# Patient Record
Sex: FEMALE | Race: WHITE | NOT HISPANIC OR LATINO | Employment: UNEMPLOYED | URBAN - METROPOLITAN AREA
[De-identification: names, ages, dates, MRNs, and addresses within clinical notes are randomized per-mention and may not be internally consistent; named-entity substitution may affect disease eponyms.]

---

## 2017-12-27 ENCOUNTER — PRE VISIT (OUTPATIENT)
Dept: MATERNAL FETAL MEDICINE | Facility: CLINIC | Age: 27
End: 2017-12-27

## 2018-01-02 ENCOUNTER — OFFICE VISIT (OUTPATIENT)
Dept: MATERNAL FETAL MEDICINE | Facility: CLINIC | Age: 28
End: 2018-01-02
Attending: FAMILY MEDICINE
Payer: COMMERCIAL

## 2018-01-02 ENCOUNTER — HOSPITAL ENCOUNTER (OUTPATIENT)
Dept: ULTRASOUND IMAGING | Facility: CLINIC | Age: 28
Discharge: HOME | End: 2018-01-02
Attending: FAMILY MEDICINE | Admitting: FAMILY MEDICINE
Payer: COMMERCIAL

## 2018-01-02 DIAGNOSIS — O26.90 PREGNANCY RELATED CONDITION, UNSPECIFIED TRIMESTER: ICD-10-CM

## 2018-01-02 DIAGNOSIS — O30.031 MONOCHORIONIC DIAMNIOTIC TWIN GESTATION IN FIRST TRIMESTER: Primary | ICD-10-CM

## 2018-01-02 PROCEDURE — 76801 OB US < 14 WKS SINGLE FETUS: CPT

## 2018-01-02 NOTE — MR AVS SNAPSHOT
After Visit Summary   1/2/2018    Jennifer Art    MRN: [id number]           Patient Information     Date Of Birth          1990        Visit Information        Provider Department      1/2/2018 11:30 AM Ayanna Varma MD [facility] Maternal Fetal Medicine [facility]        Today's Diagnoses     Monochorionic diamniotic twin gestation in first trimester    -  1       Follow-ups after your visit        Your next 10 appointments already scheduled     Feb 06, 2018  1:30 PM CST   (Arrive by 1:15 PM)   MFM US OB COMPL 2/3 TRI TWINS with URMFMUSR1   [facility] Maternal Fetal Medicine Ultrasound - [facility])    [address]  [facility] [address] [phone]           Wear comfortable clothes and leave your valuables at home.            Feb 06, 2018  2:00 PM CST   Radiology MD with UR CLOVIS GIRON   [facility] Maternal Fetal Medicine - [facility])    [address]  [facility] [address]   [phone]           Please arrive at the time given for your first appointment. This visit is used internally to schedule the physician's time during your ultrasound.              Future tests that were ordered for you today     Open Future Orders        Priority Expected Expires Ordered    MFM US OB Complete 2/3 Tri Twins Routine 2/6/2018 11/2/2018 1/2/2018            Who to contact     If you have questions or need follow up information about today's clinic visit or your schedule please contact [facility] MATERNAL FETAL MEDICINE [facility] directly at [phone].  Normal or non-critical lab and imaging results will be communicated to you by MyChart, letter or phone within 4 business days after the clinic has received the results. If you do not hear from us within 7 days, please contact the clinic through MyChart or phone. If you have a critical or abnormal lab result, we will notify you by phone as soon as  "possible.  Submit refill requests through [employer] or call your pharmacy and they will forward the refill request to us. Please allow 3 business days for your refill to be completed.          Additional Information About Your Visit        imgfaveharWisecam Information     [employer] lets you send messages to your doctor, view your test results, renew your prescriptions, schedule appointments and more. To sign up, go to [email or web address] [email or web address] [email or web address] . Click on \"Log in\" on the left side of the screen, which will take you to the Welcome page. Then click on \"Sign up Now\" on the right side of the page.     You will be asked to enter the access code listed below, as well as some personal information. Please follow the directions to create your username and password.     Your access code is: [email or web address]  Expires: 2018  5:36 PM     Your access code will  in 90 days. If you need help or a new code, please call your [place] clinic or [phone].        Care EveryWhere ID     This is your Care EveryWhere ID. This could be used by other organizations to access your [place] medical records  [id number]         Blood Pressure from Last 3 Encounters:   No data found for BP    Weight from Last 3 Encounters:   No data found for Wt               Primary Care Provider Office Phone # Fax #    Talisha Austin [phone]       [facility] [address]  [facility] [address]        Equal Access to Services     [facility] Hospital[facility]EN : Hadii aad ku hadasho Soomaali, waaxda luqadaha, qaybta kaalmada adeegyada, waxay nighat reyes . So [facility] [phone].    ATENCIÓN: Si habla español, tiene a campos disposición servicios gratuitos de asistencia lingüística. Llame al [phone].    We comply with applicable federal civil rights laws and [place] laws. We do not discriminate on the basis of race, color, national origin, age, disability, sex, sexual orientation, or gender identity.            Thank you!     " Thank you for choosing [facility]  for your care. Our goal is always to provide you with excellent care. Hearing back from our patients is one way we can continue to improve our services. Please take a few minutes to complete the written survey that you may receive in the mail after your visit with us. Thank you!             Your Updated Medication List - Protect others around you: Learn how to safely use, store and throw away your medicines at www.disposemymeds.org.      Notice  As of 1/2/2018  5:36 PM    You have not been prescribed any medications.

## 2018-01-02 NOTE — PROGRESS NOTES
"Please see \"Imaging\" tab under \"Chart Review\" for details of today's visit.    Ayanna Varma    "

## 2018-02-06 ENCOUNTER — OFFICE VISIT (OUTPATIENT)
Dept: MATERNAL FETAL MEDICINE | Facility: CLINIC | Age: 28
End: 2018-02-06
Attending: OBSTETRICS & GYNECOLOGY
Payer: COMMERCIAL

## 2018-02-06 ENCOUNTER — HOSPITAL ENCOUNTER (OUTPATIENT)
Dept: ULTRASOUND IMAGING | Facility: CLINIC | Age: 28
Discharge: HOME | End: 2018-02-06
Attending: OBSTETRICS & GYNECOLOGY | Admitting: OBSTETRICS & GYNECOLOGY
Payer: COMMERCIAL

## 2018-02-06 DIAGNOSIS — O30.031 MONOCHORIONIC DIAMNIOTIC TWIN GESTATION IN FIRST TRIMESTER: ICD-10-CM

## 2018-02-06 DIAGNOSIS — O30.039 MONOCHORIONIC DIAMNIOTIC TWIN PREGNANCY, ANTEPARTUM: Primary | ICD-10-CM

## 2018-02-06 PROCEDURE — 76820 UMBILICAL ARTERY ECHO: CPT | Mod: 59 | Performed by: OBSTETRICS & GYNECOLOGY

## 2018-02-06 PROCEDURE — 76810 OB US >/= 14 WKS ADDL FETUS: CPT

## 2018-02-06 NOTE — MR AVS SNAPSHOT
After Visit Summary   2/6/2018    Jennifer Art    MRN: [id number]           Patient Information     Date Of Birth          1990        Visit Information        Provider Department      2/6/2018 2:00 PM Alvarez Sandoval MD [facility] Maternal Fetal Medicine - [place]        Today's Diagnoses     Monochorionic diamniotic twin pregnancy, antepartum    -  1       Follow-ups after your visit        Your next 10 appointments already scheduled     Feb 27, 2018  1:30 PM CST   (Arrive by 1:15 PM)   MFM US COMPRE TWINS with RHMFMUSR3   [facility] Maternal Fetal Medicine Ultrasound - [facility])    [address]  [facility] [address] [phone]           Wear comfortable clothes and leave your valuables at home.            Feb 27, 2018  2:45 PM CST   Radiology MD with  CLOVIS GIRON   [facility] Maternal Fetal Medicine - [facility])    [address]  [facility] [address] [phone]           Please arrive at the time given for your first appointment. This visit is used internally to schedule the physician's time during your ultrasound.            Mar 13, 2018 11:00 AM CDT   Ech Fetal Complete* with URFETR1   [facility] Echo[facility]EKG ([facility])    [address] [facility] [address]               Mar 13, 2018 12:00 PM CDT   Ech Fetal -Twin B Complete* with URFETR1   [facility] Echo[facility] ([facility])    [address] [facility] [address]               Mar 13, 2018  1:30 PM CDT   (Arrive by 1:15 PM)   MFM US COMPRE TWINS F/U with URMFMUSR1   [facility] Maternal Fetal Medicine Ultrasound - [place] ([facility])    [address]  [facility] [address] [phone]           Wear comfortable clothes and leave your valuables at home.            Mar 13, 2018  2:00 PM CDT   Radiology MD with UR CLOVIS GIRON   [facility] Maternal  "Fetal Medicine [facility] ([facility])    [address]  [facility] [place] [address]   [phone]           Please arrive at the time given for your first appointment. This visit is used internally to schedule the physician's time during your ultrasound.              Future tests that were ordered for you today     Open Future Orders        Priority Expected Expires Ordered    Echo Fetal Complete-Peds Cardiology Routine  2/6/2019 2/6/2018    Echo Fetal - Twin B Complete - Peds Cardiology Routine  2/6/2019 2/6/2018    [facility] US Comprehensive Twins F/U Routine 2/20/2018 12/6/2018 2/6/2018    MF US Comprehensive Twins Routine 3/6/2018 12/6/2018 2/6/2018            Who to contact     If you have questions or need follow up information about today's clinic visit or your schedule please contact [facility] [facility] MATERNAL FETAL MEDICINE [facility] directly at [phone].  Normal or non-critical lab and imaging results will be communicated to you by Entrechart, letter or phone within 4 business days after the clinic has received the results. If you do not hear from us within 7 days, please contact the clinic through Entrechart or phone. If you have a critical or abnormal lab result, we will notify you by phone as soon as possible.  Submit refill requests through [employer] or call your pharmacy and they will forward the refill request to us. Please allow 3 business days for your refill to be completed.          Additional Information About Your Visit        EntrecharAppSlingr Information     [employer] lets you send messages to your doctor, view your test results, renew your prescriptions, schedule appointments and more. To sign up, go to [email or web address] . Click on \"Log in\" on the left side of the screen, which will take you to the Welcome page. Then click on \"Sign up Now\" on the right side of the page.     You will be asked to enter the access code listed below, as well as some personal information. Please " follow the directions to create your username and password.     Your access code is: [email or web address]  Expires: 2018  5:36 PM     Your access code will  in 90 days. If you need help or a new code, please call your [place] clinic or [phone].        Care EveryWhere ID     This is your Care EveryWhere ID. This could be used by other organizations to access your [place] medical records  [id number]         Blood Pressure from Last 3 Encounters:   No data found for BP    Weight from Last 3 Encounters:   No data found for Wt               Primary Care Provider Office Phone # Fax #    Talisha Austin [phone]       [facility] [address]  [facility] [address]        Equal Access to Services     JAILYN SINGLETON : Hadii von bee hadasho Soomaali, waaxda luqadaha, qaybta kaalmada adeegyada, waxcristi disla haydiazn smith reyes . So [facility] [phone].    ATENCIÓN: Si habla español, tiene a campos disposición servicios gratuitos de asistencia lingüística. Llame al [phone].    We comply with applicable federal civil rights laws and [place] laws. We do not discriminate on the basis of race, color, national origin, age, disability, sex, sexual orientation, or gender identity.            Thank you!     Thank you for choosing [facility]  for your care. Our goal is always to provide you with excellent care. Hearing back from our patients is one way we can continue to improve our services. Please take a few minutes to complete the written survey that you may receive in the mail after your visit with us. Thank you!             Your Updated Medication List - Protect others around you: Learn how to safely use, store and throw away your medicines at www.disposemymeds.org.      Notice  As of 2018  3:22 PM    You have not been prescribed any medications.

## 2018-02-06 NOTE — PROGRESS NOTES
Please refer to ultrasound report under 'Imaging' Studies of 'Chart Review' tabs.    Alvarez Sandoval M.D.

## 2018-02-27 ENCOUNTER — HOSPITAL ENCOUNTER (OUTPATIENT)
Dept: ULTRASOUND IMAGING | Facility: CLINIC | Age: 28
Discharge: HOME | End: 2018-02-27
Attending: OBSTETRICS & GYNECOLOGY | Admitting: OBSTETRICS & GYNECOLOGY
Payer: COMMERCIAL

## 2018-02-27 ENCOUNTER — OFFICE VISIT (OUTPATIENT)
Dept: MATERNAL FETAL MEDICINE | Facility: CLINIC | Age: 28
End: 2018-02-27
Attending: OBSTETRICS & GYNECOLOGY
Payer: COMMERCIAL

## 2018-02-27 DIAGNOSIS — O30.039 MONOCHORIONIC DIAMNIOTIC TWIN PREGNANCY, ANTEPARTUM: ICD-10-CM

## 2018-02-27 DIAGNOSIS — O30.032 MONOCHORIONIC DIAMNIOTIC TWIN GESTATION IN SECOND TRIMESTER: Primary | ICD-10-CM

## 2018-02-27 PROCEDURE — 76820 UMBILICAL ARTERY ECHO: CPT

## 2018-02-27 PROCEDURE — 76811 OB US DETAILED SNGL FETUS: CPT

## 2018-02-27 PROCEDURE — 76820 UMBILICAL ARTERY ECHO: CPT | Mod: 59

## 2018-02-27 NOTE — MR AVS SNAPSHOT
After Visit Summary   2/27/2018    Jennifer Art    MRN: [id number]           Patient Information     Date Of Birth          1990        Visit Information        Provider Department      2/27/2018 2:45 PM Tiffanie Bartlett MD [facility] Maternal Fetal Medicine - [facility]        Today's Diagnoses     Monochorionic diamniotic twin gestation in second trimester    -  1       Follow-ups after your visit        Your next 10 appointments already scheduled     Mar 13, 2018 11:00 AM CDT   Ech Fetal Complete* with URFETR1   [facility] Echo[facility]EKG ([facility])    [address]  [facility] [address]               Mar 13, 2018 12:00 PM CDT   Ech Fetal -Twin B Complete* with URFETR1   [facility] Echo[facility]KG ([facility])    [address]  [facility] [address]               Mar 13, 2018  1:30 PM CDT   (Arrive by 1:15 PM)   MFM US COMPRE TWINS F/U with URMFMUSR1   [facility] Maternal Fetal Medicine Ultrasound - [facility])    [address]  [facility] [address] [phone]           Wear comfortable clothes and leave your valuables at home.            Mar 13, 2018  2:00 PM CDT   Radiology MD with UR CLOVIS GIRON   [facility] Maternal Fetal Medicine - [facility])    [address]  [facility] [address]   [phone]           Please arrive at the time given for your first appointment. This visit is used internally to schedule the physician's time during your ultrasound.            Mar 27, 2018  1:30 PM CDT   (Arrive by 1:15 PM)   MFM US COMPRE TWINS F/U with RHMFMUSR2   [facility] Maternal Fetal Medicine Ultrasound - [facility] ([facility])    [address] [facility] [address]  [facility] [address] [phone]           Wear comfortable clothes and leave your valuables at home.            Mar 27, 2018  2:00 PM CDT   Radiology MD with  CLOVIS GIRON    [facility] Maternal Fetal [facility] ([facility])    [address]  [facility] [address] [phone]           Please arrive at the time given for your first appointment. This visit is used internally to schedule the physician's time during your ultrasound.            Apr 10, 2018  1:30 PM CDT   (Arrive by 1:15 PM)   [facility] US COMPRE TWINS F/U with RHMFMUSR1   [facility] Fetal [facility] Ultrasound - [facility])    [address]  [facility] [address] [phone]           Wear comfortable clothes and leave your valuables at home.            Apr 10, 2018  2:00 PM CDT   Radiology MD with [facility] MD   [facility] Fetal [facility])    [address]  [facility] [address] [phone]           Please arrive at the time given for your first appointment. This visit is used internally to schedule the physician's time during your ultrasound.              Future tests that were ordered for you today     Open Future Orders        Priority Expected Expires Ordered    M US Comprehensive Twins F/U Routine 3/27/2018 12/27/2018 2/27/2018    [facility] US Comprehensive Twins F/U Routine 4/10/2018 12/27/2018 2/27/2018            Who to contact     If you have questions or need follow up information about today's clinic visit or your schedule please contact [facility] F[facility]AL [facility] directly at [phone].  Normal or non-critical lab and imaging results will be communicated to you by [employer], letter or phone within 4 business days after the clinic has received the results. If you do not hear from us within 7 days, please contact the clinic through [employer] Scholar[employer] or phone. If you have a critical or abnormal lab result, we will notify you by phone as soon as possible.  Submit refill requests through [employer] or call your pharmacy and they will forward the refill request to us. Please  "allow 3 business days for your refill to be completed.          Additional Information About Your Visit        MyChart Information     Ticket[employer] lets you send messages to your doctor, view your test results, renew your prescriptions, schedule appointments and more. To sign up, go to [email or web address] . Click on \"Log in\" on the left side of the screen, which will take you to the Welcome page. Then click on \"Sign up Now\" on the right side of the page.     You will be asked to enter the access code listed below, as well as some personal information. Please follow the directions to create your username and password.     Your access code is: [email or web address]  Expires: 2018  5:36 PM     Your access code will  in 90 days. If you need help or a new code, please call your [place] clinic or [phone].        Care EveryWhere ID     This is your Care EveryWhere ID. This could be used by other organizations to access your [place] medical records  [id number]         Blood Pressure from Last 3 Encounters:   No data found for BP    Weight from Last 3 Encounters:   No data found for Wt               Primary Care Provider Office Phone # Fax #    Talisha Austin [phone]       [facility] [address]  [facility] [address]        Equal Access to Services     [facility] CenterKIRSTEN : Hadii aad ku hadasho Soomaali, waaxda luqadaha, qaybta kaalmada adeegyada, randy reyes . So [facility] [place] [facility] [phone].    ATENCIÓN: Si habla español, tiene a campos disposición servicios gratuitos de asistencia lingüística. Llame al [phone].    We comply with applicable federal civil rights laws and [place] laws. We do not discriminate on the basis of race, color, national origin, age, disability, sex, sexual orientation, or gender identity.            Thank you!     Thank you for choosing [facility]  for your care. Our goal is always to provide you with excellent " care. Hearing back from our patients is one way we can continue to improve our services. Please take a few minutes to complete the written survey that you may receive in the mail after your visit with us. Thank you!             Your Updated Medication List - Protect others around you: Learn how to safely use, store and throw away your medicines at www.disposemymeds.org.      Notice  As of 2/27/2018  3:33 PM    You have not been prescribed any medications.

## 2018-02-27 NOTE — PROGRESS NOTES
Please see full imaging report from ViewPoint program under imaging tab.    Reassuring findings reviewed with Jennifer today. She will have fetal echos in two weeks (for monodi twins and 2VC twin A/1) and have TTTS surveillance with our practice every two weeks. She is being co-managed by a local doctor at [facility] (Dr. Talisha Austin) in [place] and also Dr. Salomon ([facility] [place]) for anticipated delivery at 34-37 weeks (depending on fetal status) at [facility].    Tiffanie Bartlett MD  Maternal Fetal Medicine

## 2018-03-13 ENCOUNTER — HOSPITAL ENCOUNTER (OUTPATIENT)
Dept: CARDIOLOGY | Facility: CLINIC | Age: 28
End: 2018-03-13
Attending: OBSTETRICS & GYNECOLOGY
Payer: COMMERCIAL

## 2018-03-13 ENCOUNTER — HOSPITAL ENCOUNTER (OUTPATIENT)
Dept: ULTRASOUND IMAGING | Facility: CLINIC | Age: 28
Discharge: HOME | End: 2018-03-13
Attending: OBSTETRICS & GYNECOLOGY | Admitting: OBSTETRICS & GYNECOLOGY
Payer: COMMERCIAL

## 2018-03-13 ENCOUNTER — OFFICE VISIT (OUTPATIENT)
Dept: MATERNAL FETAL MEDICINE | Facility: CLINIC | Age: 28
End: 2018-03-13
Attending: OBSTETRICS & GYNECOLOGY
Payer: COMMERCIAL

## 2018-03-13 DIAGNOSIS — O30.039 MONOCHORIONIC DIAMNIOTIC TWIN PREGNANCY, ANTEPARTUM: ICD-10-CM

## 2018-03-13 DIAGNOSIS — O30.032 MONOCHORIONIC DIAMNIOTIC TWIN GESTATION IN SECOND TRIMESTER: Primary | ICD-10-CM

## 2018-03-13 DIAGNOSIS — Q27.0 SINGLE UMBILICAL ARTERY: ICD-10-CM

## 2018-03-13 PROCEDURE — 76820 UMBILICAL ARTERY ECHO: CPT

## 2018-03-13 PROCEDURE — 76825 ECHO EXAM OF FETAL HEART: CPT | Mod: 59

## 2018-03-13 PROCEDURE — 76816 OB US FOLLOW-UP PER FETUS: CPT | Mod: 59

## 2018-03-13 PROCEDURE — 93325 DOPPLER ECHO COLOR FLOW MAPG: CPT

## 2018-03-27 ENCOUNTER — HOSPITAL ENCOUNTER (OUTPATIENT)
Dept: ULTRASOUND IMAGING | Facility: CLINIC | Age: 28
Discharge: HOME | End: 2018-03-27
Attending: OBSTETRICS & GYNECOLOGY | Admitting: OBSTETRICS & GYNECOLOGY
Payer: COMMERCIAL

## 2018-03-27 ENCOUNTER — OFFICE VISIT (OUTPATIENT)
Dept: MATERNAL FETAL MEDICINE | Facility: CLINIC | Age: 28
End: 2018-03-27
Attending: OBSTETRICS & GYNECOLOGY
Payer: COMMERCIAL

## 2018-03-27 DIAGNOSIS — O09.899 SINGLE UMBILICAL ARTERY AFFECTING MANAGEMENT OF MOTHER IN SINGLETON PREGNANCY, ANTEPARTUM: ICD-10-CM

## 2018-03-27 DIAGNOSIS — O30.032 MONOCHORIONIC DIAMNIOTIC TWIN GESTATION IN SECOND TRIMESTER: Primary | ICD-10-CM

## 2018-03-27 DIAGNOSIS — O30.032 MONOCHORIONIC DIAMNIOTIC TWIN GESTATION IN SECOND TRIMESTER: ICD-10-CM

## 2018-03-27 PROCEDURE — 76816 OB US FOLLOW-UP PER FETUS: CPT | Mod: 59

## 2018-03-27 PROCEDURE — 76820 UMBILICAL ARTERY ECHO: CPT | Performed by: OBSTETRICS & GYNECOLOGY

## 2018-03-27 NOTE — MR AVS SNAPSHOT
After Visit Summary   3/27/2018    Jennifer Art    MRN: [id number]           Patient Information     Date Of Birth          1990        Visit Information        Provider Department      3/27/2018 2:00 PM Aynana Varma MD [facility] Maternal Fetal Medicine [facility]        Today's Diagnoses     Monochorionic diamniotic twin gestation in second trimester    -  1    Single umbilical artery affecting management of mother in robles pregnancy, antepartum           Follow-ups after your visit        Your next 10 appointments already scheduled     Apr 10, 2018  1:30 PM CDT   (Arrive by 1:15 PM)   M US COMPRE TWINS F/U with RHMFMUSR1   [facility] Maternal Fetal Medicine Ultrasound - [facility])    [address] [place] [address] [phone]           Wear comfortable clothes and leave your valuables at home.            Apr 10, 2018  2:00 PM CDT   Radiology MD with [facility] MD   [facility] Fetal Medicine [facility] ([facility])    [address]  [facility] [address] [phone]           Please arrive at the time given for your first appointment. This visit is used internally to schedule the physician's time during your ultrasound.              Who to contact     If you have questions or need follow up information about today's clinic visit or your schedule please contact [facility] MATERNAL FETAL MEDICINE [facility] directly at [phone].  Normal or non-critical lab and imaging results will be communicated to you by MyChart, letter or phone within 4 business days after the clinic has received the results. If you do not hear from us within 7 days, please contact the clinic through MyChart or phone. If you have a critical or abnormal lab result, we will notify you by phone as soon as possible.  Submit refill requests through [employer] or call your pharmacy and they will forward the refill request to us. Please allow  "3 business days for your refill to be completed.          Additional Information About Your Visit        MyChart Information     Lomographyhart lets you send messages to your doctor, view your test results, renew your prescriptions, schedule appointments and more. To sign up, go to [email or web address] . Click on \"Log in\" on the left side of the screen, which will take you to the Welcome page. Then click on \"Sign up Now\" on the right side of the page.     You will be asked to enter the access code listed below, as well as some personal information. Please follow the directions to create your username and password.     Your access code is: [email or web address]  Expires: 2018  6:36 PM     Your access code will  in 90 days. If you need help or a new code, please call your [place] clinic or [phone].        Care EveryWhere ID     This is your Care EveryWhere ID. This could be used by other organizations to access your [place] medical records  [id number]         Blood Pressure from Last 3 Encounters:   No data found for BP    Weight from Last 3 Encounters:   No data found for Wt              Today, you had the following     No orders found for display       Primary Care Provider Office Phone # Fax #    Talisha Austin [phone]       [facility] [address]  [facility] [address]        Equal Access to Services     [facility] Hospital[facility]IRSTEN : Hadii aad ku hadasho Soomaali, waaxda luqadaha, qaybta kaalmada smithyada, randy reyes . So [facility] [phone].    ATENCIÓN: Si habla español, tiene a campos disposición servicios gratuitos de asistencia lingüística. Arslan al [phone].    We comply with applicable federal civil rights laws and [place] laws. We do not discriminate on the basis of race, color, national origin, age, disability, sex, sexual orientation, or gender identity.            Thank you!     Thank you for choosing [facility]  for " your care. Our goal is always to provide you with excellent care. Hearing back from our patients is one way we can continue to improve our services. Please take a few minutes to complete the written survey that you may receive in the mail after your visit with us. Thank you!             Your Updated Medication List - Protect others around you: Learn how to safely use, store and throw away your medicines at www.disposemymeds.org.      Notice  As of 3/27/2018  2:44 PM    You have not been prescribed any medications.

## 2018-04-10 ENCOUNTER — HOSPITAL ENCOUNTER (OUTPATIENT)
Dept: ULTRASOUND IMAGING | Facility: CLINIC | Age: 28
Discharge: HOME | End: 2018-04-10
Attending: OBSTETRICS & GYNECOLOGY | Admitting: OBSTETRICS & GYNECOLOGY
Payer: COMMERCIAL

## 2018-04-10 ENCOUNTER — OFFICE VISIT (OUTPATIENT)
Dept: MATERNAL FETAL MEDICINE | Facility: CLINIC | Age: 28
End: 2018-04-10
Attending: OBSTETRICS & GYNECOLOGY
Payer: COMMERCIAL

## 2018-04-10 DIAGNOSIS — O30.032 MONOCHORIONIC DIAMNIOTIC TWIN GESTATION IN SECOND TRIMESTER: Primary | ICD-10-CM

## 2018-04-10 DIAGNOSIS — O30.032 MONOCHORIONIC DIAMNIOTIC TWIN GESTATION IN SECOND TRIMESTER: ICD-10-CM

## 2018-04-10 PROCEDURE — 76816 OB US FOLLOW-UP PER FETUS: CPT | Mod: 59

## 2018-04-10 PROCEDURE — 76820 UMBILICAL ARTERY ECHO: CPT | Mod: 59 | Performed by: OBSTETRICS & GYNECOLOGY

## 2018-04-10 NOTE — NURSING NOTE
Jennifer presents to [facility] for TTTS evaluation for [place] twins. Denies LOF, vaginal bleeding or frequent contractions - does have occasional montana ruiz contractions. Positive fetal movement. Patient is scheduled to come back to [facility] in 2 weeks for growth US. I called [facility] and spoke to Dr Salomon, who states they are able to perform the TTTS at their clinic. Patient would prefer this as she drives a long distance to our clinic. He was notified that patient had a TTTS US today and will need another one in 4 weeks. I called Jennifer to inform her of this plan. She is happy with POC and understands to still come to US appointment on 4/24/18.

## 2018-04-10 NOTE — PROGRESS NOTES
Please see full imaging report from [facility] program under imaging tab.    Findings were reviewed with Jennifer and her friend today. She has now transferred her care to [facility] in [place]. We will coordinate with them to see if some of her surveillance US (possibly the TTTS checks and then we would to monthly growth assessments) might be done with her OB visits there, since she is driving from [facility] [place], for her care. Follow up tentatively scheduled here in 2 and 4 weeks, pending discussions with [facility].     Addendum - we spoke with [facility] and Dr. Salomon is able to do TTTS checks. Will do next one in 4 weeks, and we will do interval twin growth in 2 weeks.     Tiffanie Bartlett MD  Maternal Fetal Medicine

## 2018-04-10 NOTE — MR AVS SNAPSHOT
After Visit Summary   4/10/2018    Jennifer Art    MRN: [id number]           Patient Information     Date Of Birth          1990        Visit Information        Provider Department      4/10/2018 2:00 PM Tiffanie Bartlett MD [facility] Maternal Fetal Medicine [facility]        Today's Diagnoses     Monochorionic diamniotic twin gestation in second trimester    -  1       Follow-ups after your visit        Your next 10 appointments already scheduled     Apr 24, 2018 11:45 AM CDT   (Arrive by 11:30 AM)   MFM US COMPRE TWINS F/U with RHMFMUSR1   [facility] Maternal Fetal Medicine Ultrasound - [facility] ([facility])    [address]  [facility] [address]   [phone]           Wear comfortable clothes and leave your valuables at home.            Apr 24, 2018 12:15 PM CDT   Radiology MD with  CLOVIS GIRON   [facility] ([facility])    [address]  [facility] [address]   [phone]           Please arrive at the time given for your first appointment. This visit is used internally to schedule the physician's time during your ultrasound.            May 08, 2018  1:30 PM CDT   (Arrive by 1:15 PM)   MFM US COMPRE TWINS F/U with RHMFMUSR2   [facility] Maternal Fetal Medicine Ultrasound - [facility] ([facility])    [address]  [facility] [address]   [phone]           Wear comfortable clothes and leave your valuables at home.            May 08, 2018  2:00 PM CDT   [facility]   [facility] Fetal [facility] ([facility])    [address]  [facility] [address]   [phone]           Please arrive at the time given for your first appointment. This visit is used internally to schedule the physician's time during your ultrasound.              Future tests that were ordered for you today     Open Future Orders         "Priority Expected Expires Ordered    [facility] Comprehensive Twins F/U Routine 2018 2/10/2019 4/10/2018    [facility] Comprehensive Twins F/U Routine 2018 2/10/2019 4/10/2018            Who to contact     If you have questions or need follow up information about today's clinic visit or your schedule please contact [facility] MATERNAL FETAL MEDICINE [facility] directly at [phone].  Normal or non-critical lab and imaging results will be communicated to you by InfernoRed Technologyhart, letter or phone within 4 business days after the clinic has received the results. If you do not hear from us within 7 days, please contact the clinic through EnerVaultt or phone. If you have a critical or abnormal lab result, we will notify you by phone as soon as possible.  Submit refill requests through GutCheck or call your pharmacy and they will forward the refill request to us. Please allow 3 business days for your refill to be completed.          Additional Information About Your Visit        InfernoRed TechnologyharOmniEarth Information     GutCheck lets you send messages to your doctor, view your test results, renew your prescriptions, schedule appointments and more. To sign up, go to [email or web address] . Click on \"Log in\" on the left side of the screen, which will take you to the Welcome page. Then click on \"Sign up Now\" on the right side of the page.     You will be asked to enter the access code listed below, as well as some personal information. Please follow the directions to create your username and password.     Your access code is: [email or web address]  Expires: 2018  2:36 PM     Your access code will  in 90 days. If you need help or a new code, please call your [place] clinic or [phone].        Care EveryWhere ID     This is your Care EveryWhere ID. This could be used by other organizations to access your [place] medical records  [id number]         Blood Pressure from Last 3 Encounters:   No data found for BP    Weight from Last 3 Encounters:   No " data found for                Primary Care Provider Office Phone # Fax #    Talisha Austin [phone]       [facility] [address]  [facility] [address]        Equal Access to Services     JAILYN SINGLETON : Hadii aad ku hadaleaho Soomaali, waaxda luqadaha, qaybta kaalmada adeegyada, randy moralesn smith goodman laPiedadtara rose. So [facility] [phone].    ATENCIÓN: Si habla español, tiene a campos disposición servicios gratuitos de asistencia lingüística. Llame al [phone].    We comply with applicable federal civil rights laws and [place] laws. We do not discriminate on the basis of race, color, national origin, age, disability, sex, sexual orientation, or gender identity.            Thank you!     Thank you for choosing [facility]  for your care. Our goal is always to provide you with excellent care. Hearing back from our patients is one way we can continue to improve our services. Please take a few minutes to complete the written survey that you may receive in the mail after your visit with us. Thank you!             Your Updated Medication List - Protect others around you: Learn how to safely use, store and throw away your medicines at www.disposemymeds.org.      Notice  As of 4/10/2018  2:36 PM    You have not been prescribed any medications.

## 2018-04-24 ENCOUNTER — HOSPITAL ENCOUNTER (OUTPATIENT)
Dept: ULTRASOUND IMAGING | Facility: CLINIC | Age: 28
Discharge: HOME | End: 2018-04-24
Attending: OBSTETRICS & GYNECOLOGY | Admitting: OBSTETRICS & GYNECOLOGY
Payer: COMMERCIAL

## 2018-04-24 ENCOUNTER — OFFICE VISIT (OUTPATIENT)
Dept: MATERNAL FETAL MEDICINE | Facility: CLINIC | Age: 28
End: 2018-04-24
Attending: OBSTETRICS & GYNECOLOGY
Payer: COMMERCIAL

## 2018-04-24 DIAGNOSIS — O09.899 SINGLE UMBILICAL ARTERY AFFECTING MANAGEMENT OF MOTHER IN SINGLETON PREGNANCY, ANTEPARTUM: ICD-10-CM

## 2018-04-24 DIAGNOSIS — O30.032 MONOCHORIONIC DIAMNIOTIC TWIN GESTATION IN SECOND TRIMESTER: Primary | ICD-10-CM

## 2018-04-24 DIAGNOSIS — O30.032 MONOCHORIONIC DIAMNIOTIC TWIN GESTATION IN SECOND TRIMESTER: ICD-10-CM

## 2018-04-24 PROCEDURE — 76820 UMBILICAL ARTERY ECHO: CPT | Performed by: OBSTETRICS & GYNECOLOGY

## 2018-04-24 PROCEDURE — 76816 OB US FOLLOW-UP PER FETUS: CPT

## 2018-04-24 NOTE — MR AVS SNAPSHOT
After Visit Summary   4/24/2018    Jennifer Art    MRN: [id number]           Patient Information     Date Of Birth          1990        Visit Information        Provider Department      4/24/2018 12:15 PM Alvarez Sandoval MD [facility] Maternal Fetal Medicine [facility]        Today's Diagnoses     Monochorionic diamniotic twin gestation in second trimester    -  1    Single umbilical artery affecting management of mother in robles pregnancy, antepartum           Follow-ups after your visit        Your next 10 appointments already scheduled     May 22, 2018  1:30 PM CDT   (Arrive by 1:15 PM)   [facility] US COMPRE TWINS F/U with MFMUSR1   [facility] Maternal Fetal Medicine Ultrasound - [facility] ([facility])    [address]  [facility] [address] [phone]           Wear comfortable clothes and leave your valuables at home.            May 22, 2018  2:00 PM CDT   Radiology MD with [facility] MD   [facility] Fetal Medicine [facility] ([facility])    [address] [address] [facility] [address]  [facility] [address] [phone]           Please arrive at the time given for your first appointment. This visit is used internally to schedule the physician's time during your ultrasound.              Future tests that were ordered for you today     Open Future Orders        Priority Expected Expires Ordered    MFM US Comprehensive Twins F/U Routine 5/22/2018 2/24/2019 4/24/2018            Who to contact     If you have questions or need follow up information about today's clinic visit or your schedule please contact [facility] FETAL [facility] directly at [phone].  Normal or non-critical lab and imaging results will be communicated to you by MyChart, letter or phone within 4 business days after the clinic has received the results. If you do not hear from us within 7 days, please contact the clinic through MyChart or phone. If you have a critical  "or abnormal lab result, we will notify you by phone as soon as possible.  Submit refill requests through [employer] or call your pharmacy and they will forward the refill request to us. Please allow 3 business days for your refill to be completed.          Additional Information About Your Visit        MyChart Information     [employer] lets you send messages to your doctor, view your test results, renew your prescriptions, schedule appointments and more. To sign up, go to [email or web address] . Click on \"Log in\" on the left side of the screen, which will take you to the Welcome page. Then click on \"Sign up Now\" on the right side of the page.     You will be asked to enter the access code listed below, as well as some personal information. Please follow the directions to create your username and password.     Your access code is: [email or web address]  Expires: 2018  2:36 PM     Your access code will  in 90 days. If you need help or a new code, please call your [place] clinic or [phone].        Care EveryWhere ID     This is your Care EveryWhere ID. This could be used by other organizations to access your [place] medical records  [id number]         Blood Pressure from Last 3 Encounters:   No data found for BP    Weight from Last 3 Encounters:   No data found for Wt               Primary Care Provider Office Phone # Fax #    Talisha Austin [phone]       [facility] [address]  [facility] [address]        Equal Access to Services     JAILYN SINGLETON : Hadii aad ku hadasho Soomaali, waaxda luqadaha, qaybta kaalmada adeegyada, randy disla haydiazn smith reyes . So [facility] [phone].    ATENCIÓN: Si habla español, tiene a campos disposición servicios gratuitos de asistencia lingüística. Llame al [phone].    We comply with applicable federal civil rights laws and [place] laws. We do not discriminate on the basis of race, color, national origin, age, disability, sex, sexual " orientation, or gender identity.            Thank you!     Thank you for choosing [facility]  for your care. Our goal is always to provide you with excellent care. Hearing back from our patients is one way we can continue to improve our services. Please take a few minutes to complete the written survey that you may receive in the mail after your visit with us. Thank you!             Your Updated Medication List - Protect others around you: Learn how to safely use, store and throw away your medicines at www.disposemymeds.org.      Notice  As of 4/24/2018  1:02 PM    You have not been prescribed any medications.

## 2018-05-08 ENCOUNTER — TRANSFERRED RECORDS (OUTPATIENT)
Dept: HEALTH INFORMATION MANAGEMENT | Facility: CLINIC | Age: 28
End: 2018-05-08

## 2018-05-15 ENCOUNTER — AMBULATORY - HEALTHEAST (OUTPATIENT)
Dept: LAB | Facility: CLINIC | Age: 28
End: 2018-05-15

## 2018-05-15 ENCOUNTER — COMMUNICATION - HEALTHEAST (OUTPATIENT)
Dept: TELEHEALTH | Facility: CLINIC | Age: 28
End: 2018-05-15

## 2018-05-15 DIAGNOSIS — O99.810 ABNORMAL MATERNAL GLUCOSE TOLERANCE, ANTEPARTUM: ICD-10-CM

## 2018-05-15 LAB
FASTING STATUS PATIENT QL REPORTED: YES
GLUCOSE 1H P 100 G GLC PO SERPL-MCNC: 146 MG/DL (ref 70–179)
GLUCOSE 2H P 100 G GLC PO SERPL-MCNC: 122 MG/DL (ref 70–154)
GLUCOSE 3H P 100 G GLC PO SERPL-MCNC: 114 MG/DL (ref 70–139)
GLUCOSE P FAST SERPL-MCNC: 72 MG/DL (ref 70–94)

## 2018-05-22 ENCOUNTER — HOSPITAL ENCOUNTER (OUTPATIENT)
Dept: ULTRASOUND IMAGING | Facility: CLINIC | Age: 28
Discharge: HOME | End: 2018-05-22
Attending: OBSTETRICS & GYNECOLOGY | Admitting: OBSTETRICS & GYNECOLOGY
Payer: COMMERCIAL

## 2018-05-22 ENCOUNTER — OFFICE VISIT (OUTPATIENT)
Dept: MATERNAL FETAL MEDICINE | Facility: CLINIC | Age: 28
End: 2018-05-22
Attending: OBSTETRICS & GYNECOLOGY
Payer: COMMERCIAL

## 2018-05-22 DIAGNOSIS — O30.039 MONOCHORIONIC DIAMNIOTIC TWIN PREGNANCY, ANTEPARTUM: Primary | ICD-10-CM

## 2018-05-22 DIAGNOSIS — O09.899 SINGLE UMBILICAL ARTERY AFFECTING MANAGEMENT OF MOTHER IN SINGLETON PREGNANCY, ANTEPARTUM: ICD-10-CM

## 2018-05-22 DIAGNOSIS — O30.032 MONOCHORIONIC DIAMNIOTIC TWIN GESTATION IN SECOND TRIMESTER: ICD-10-CM

## 2018-05-22 PROCEDURE — 76820 UMBILICAL ARTERY ECHO: CPT | Performed by: OBSTETRICS & GYNECOLOGY

## 2018-05-22 PROCEDURE — 76816 OB US FOLLOW-UP PER FETUS: CPT

## 2018-05-22 NOTE — MR AVS SNAPSHOT
"              After Visit Summary   5/22/2018    Jennifer Art    MRN: [id number]           Patient Information     Date Of Birth          1990        Visit Information        Provider Department      5/22/2018 2:00 PM Alvarez Sandoval MD [facility] Maternal Fetal Medicine [facility]        Today's Diagnoses     Monochorionic diamniotic twin pregnancy, antepartum    -  1       Follow-ups after your visit        Future tests that were ordered for you today     Open Future Orders        Priority Expected Expires Ordered    [facility] Comprehensive Twins F/U Routine 6/5/2018 3/22/2019 5/22/2018    [facility] Comprehensive Twins F/U Routine 6/19/2018 3/22/2019 5/22/2018            Who to contact     If you have questions or need follow up information about today's clinic visit or your schedule please contact [facility] MATERNAL FETAL MEDICINE [facility] directly at [phone].  Normal or non-critical lab and imaging results will be communicated to you by MD On-Linehart, letter or phone within 4 business days after the clinic has received the results. If you do not hear from us within 7 days, please contact the clinic through MD On-Linehart or phone. If you have a critical or abnormal lab result, we will notify you by phone as soon as possible.  Submit refill requests through Disrupt CK or call your pharmacy and they will forward the refill request to us. Please allow 3 business days for your refill to be completed.          Additional Information About Your Visit        MyChart Information     Disrupt CK lets you send messages to your doctor, view your test results, renew your prescriptions, schedule appointments and more. To sign up, go to [email or web address] . Click on \"Log in\" on the left side of the screen, which will take you to the Welcome page. Then click on \"Sign up Now\" on the right side of the page.     You will be asked to enter the access code listed below, as well as some personal information. Please follow the directions to " create your username and password.     Your access code is: [email or web address]  Expires: 2018  2:36 PM     Your access code will  in 90 days. If you need help or a new code, please call your [facility] or [phone].        Care EveryWhere ID     This is your Care EveryWhere ID. This could be used by other organizations to access your [place] medical records  [id number]         Blood Pressure from Last 3 Encounters:   No data found for BP    Weight from Last 3 Encounters:   No data found for Wt               Primary Care Provider Office Phone # Fax #    Talisha Austin [phone]       [facility] [address]  [facility] [address]        Equal Access to Services     JAILYN SINGLETON : Hadii von bee hadasho Soomaali, waaxda luqadaha, qaybta kaalmada adeegyada, randy tomin andrewn smith reyes . So [facility] [phone].    ATENCIÓN: Si habla español, tiene a campos disposición servicios gratuitos de asistencia lingüística. Llame al [phone].    We comply with applicable federal civil rights laws and [place] laws. We do not discriminate on the basis of race, color, national origin, age, disability, sex, sexual orientation, or gender identity.            Thank you!     Thank you for choosing [facility]  for your care. Our goal is always to provide you with excellent care. Hearing back from our patients is one way we can continue to improve our services. Please take a few minutes to complete the written survey that you may receive in the mail after your visit with us. Thank you!             Your Updated Medication List - Protect others around you: Learn how to safely use, store and throw away your medicines at www.disposemymeds.org.      Notice  As of 2018  3:00 PM    You have not been prescribed any medications.

## 2020-06-09 ENCOUNTER — AMBULATORY - HEALTHEAST (OUTPATIENT)
Dept: SURGERY | Facility: HOSPITAL | Age: 30
End: 2020-06-09

## 2020-06-09 DIAGNOSIS — Z11.59 ENCOUNTER FOR SCREENING FOR OTHER VIRAL DISEASES: ICD-10-CM

## 2020-07-09 ASSESSMENT — MIFFLIN-ST. JEOR: SCORE: 1184.02

## 2020-07-10 ENCOUNTER — ANESTHESIA - HEALTHEAST (OUTPATIENT)
Dept: SURGERY | Facility: HOSPITAL | Age: 30
End: 2020-07-10

## 2020-07-10 ENCOUNTER — SURGERY - HEALTHEAST (OUTPATIENT)
Dept: SURGERY | Facility: HOSPITAL | Age: 30
End: 2020-07-10

## 2020-07-10 ASSESSMENT — MIFFLIN-ST. JEOR: SCORE: 1210.33

## 2021-06-04 VITALS — BODY MASS INDEX: 22.81 KG/M2 | HEIGHT: 61 IN | WEIGHT: 120.8 LBS

## 2021-06-09 NOTE — ANESTHESIA PREPROCEDURE EVALUATION
Anesthesia Evaluation      Patient summary reviewed   No history of anesthetic complications     Airway   Mallampati: II  Neck ROM: full   Pulmonary - normal exam    breath sounds clear to auscultation  (+) a smoker  (-) sleep apnea                         Cardiovascular - negative ROS and normal exam  Exercise tolerance: > or = 4 METS  (-) murmur  Rhythm: regular  Rate: normal,    no murmur   ROS comment: anemia     Neuro/Psych    (+) depression, anxiety/panic attacks,     Comments: migraines    Endo/Other    (-) no obesity, not pregnant     Comments: Menorrhagia  Iron def    GI/Hepatic/Renal - negative ROS           Dental - normal exam                        Anesthesia Plan  Planned anesthetic: general endotracheal    ASA 2   Induction: intravenous   Anesthetic plan and risks discussed with: patient  Anesthesia plan special considerations: antiemetics,   Post-op plan: routine recovery

## 2021-06-09 NOTE — ANESTHESIA POSTPROCEDURE EVALUATION
Patient: Jennifer Art  Procedure(s):  ROBOTIC TOTAL LAPAROSCOPIC HYSTERECTOMY BILATERAL SALPINGECTOMY CYSTOSCOPY  Anesthesia type: No value filed.    Patient location: PACU  Last vitals:   Vitals Value Taken Time   /60 7/10/2020  3:40 PM   Temp 37.5  C (99.5  F) 7/10/2020  2:46 PM   Pulse 57 7/10/2020  3:41 PM   Resp 20 7/10/2020  3:40 PM   SpO2 100 % 7/10/2020  3:41 PM   Vitals shown include unvalidated device data.  Post vital signs: stable  Level of consciousness: awake and responds to simple questions  Post-anesthesia pain: pain controlled  Post-anesthesia nausea and vomiting: no  Pulmonary: unassisted, return to baseline  Cardiovascular: stable and blood pressure at baseline  Hydration: adequate  Anesthetic events: no    QCDR Measures:  ASA# 11 - Elida-op Cardiac Arrest: ASA11B - Patient did NOT experience unanticipated cardiac arrest  ASA# 12 - Elida-op Mortality Rate: ASA12B - Patient did NOT die  ASA# 13 - PACU Re-Intubation Rate: ASA13B - Patient did NOT require a new airway mgmt  ASA# 10 - Composite Anes Safety: ASA10A - No serious adverse event    Additional Notes:

## 2021-06-09 NOTE — ANESTHESIA CARE TRANSFER NOTE
Last vitals:   Vitals:    07/10/20 1446   BP: 114/68   Pulse: 68   Resp: 20   Temp: 37.5  C (99.5  F)   SpO2: 100%     Patient's level of consciousness is drowsy  Spontaneous respirations: yes  Maintains airway independently: yes  Dentition unchanged: yes  Oropharynx: oropharynx clear of all foreign objects    QCDR Measures:  ASA# 20 - Surgical Safety Checklist: WHO surgical safety checklist completed prior to induction    PQRS# 430 - Adult PONV Prevention: 4558F - Pt received => 2 anti-emetic agents (different classes) preop & intraop  ASA# 8 - Peds PONV Prevention: NA - Not pediatric patient, not GA or 2 or more risk factors NOT present  PQRS# 424 - Elida-op Temp Management: 4559F - At least one body temp DOCUMENTED => 35.5C or 95.9F within required timeframe  PQRS# 426 - PACU Transfer Protocol: - Transfer of care checklist used  ASA# 14 - Acute Post-op Pain: ASA14B - Patient did NOT experience pain >= 7 out of 10

## 2021-07-03 NOTE — ADDENDUM NOTE
Addendum Note by Milli Ibarra RN at 6/9/2020  3:19 PM     Author: Milli Ibarra RN Service: -- Author Type: Registered Nurse    Filed: 7/2/2020  8:18 AM Encounter Date: 6/9/2020 Status: Signed    : Milli Ibarra RN (Registered Nurse)    Addended by: MILLI IBARRA on: 7/2/2020 08:18 AM        Modules accepted: Orders

## 2022-04-13 ENCOUNTER — NURSE TRIAGE (OUTPATIENT)
Dept: NURSING | Facility: CLINIC | Age: 32
End: 2022-04-13
Payer: COMMERCIAL

## 2022-04-13 ENCOUNTER — OFFICE VISIT (OUTPATIENT)
Dept: FAMILY MEDICINE | Facility: CLINIC | Age: 32
End: 2022-04-13
Payer: COMMERCIAL

## 2022-04-13 VITALS
SYSTOLIC BLOOD PRESSURE: 118 MMHG | DIASTOLIC BLOOD PRESSURE: 66 MMHG | OXYGEN SATURATION: 98 % | HEIGHT: 61 IN | TEMPERATURE: 99.1 F | RESPIRATION RATE: 18 BRPM | HEART RATE: 78 BPM | WEIGHT: 115 LBS | BODY MASS INDEX: 21.71 KG/M2

## 2022-04-13 DIAGNOSIS — G43.111 INTRACTABLE MIGRAINE WITH AURA WITH STATUS MIGRAINOSUS: Primary | ICD-10-CM

## 2022-04-13 DIAGNOSIS — F41.1 GENERALIZED ANXIETY DISORDER: ICD-10-CM

## 2022-04-13 DIAGNOSIS — F33.1 MODERATE RECURRENT MAJOR DEPRESSION (H): ICD-10-CM

## 2022-04-13 PROCEDURE — 99204 OFFICE O/P NEW MOD 45 MIN: CPT | Performed by: FAMILY MEDICINE

## 2022-04-13 RX ORDER — PROPRANOLOL HCL 60 MG
60 CAPSULE, EXTENDED RELEASE 24HR ORAL DAILY
Qty: 30 CAPSULE | Refills: 1 | Status: SHIPPED | OUTPATIENT
Start: 2022-04-13 | End: 2022-05-05 | Stop reason: SINTOL

## 2022-04-13 RX ORDER — PROMETHAZINE HYDROCHLORIDE 25 MG/1
25 TABLET ORAL EVERY 6 HOURS PRN
Qty: 20 TABLET | Refills: 1 | Status: SHIPPED | OUTPATIENT
Start: 2022-04-13 | End: 2022-05-05

## 2022-04-13 RX ORDER — SUMATRIPTAN 50 MG/1
50 TABLET, FILM COATED ORAL
Qty: 18 TABLET | Refills: 3 | Status: SHIPPED | OUTPATIENT
Start: 2022-04-13

## 2022-04-13 ASSESSMENT — PATIENT HEALTH QUESTIONNAIRE - PHQ9
SUM OF ALL RESPONSES TO PHQ QUESTIONS 1-9: 15
10. IF YOU CHECKED OFF ANY PROBLEMS, HOW DIFFICULT HAVE THESE PROBLEMS MADE IT FOR YOU TO DO YOUR WORK, TAKE CARE OF THINGS AT HOME, OR GET ALONG WITH OTHER PEOPLE: NOT DIFFICULT AT ALL
SUM OF ALL RESPONSES TO PHQ QUESTIONS 1-9: 15

## 2022-04-13 ASSESSMENT — ENCOUNTER SYMPTOMS: HEADACHES: 1

## 2022-04-13 ASSESSMENT — PAIN SCALES - GENERAL: PAINLEVEL: EXTREME PAIN (8)

## 2022-04-13 NOTE — PROGRESS NOTES
Assessment & Plan     Intractable migraine with aura with status migrainosus   risks, benefits and alternatives discussed   Discussed prophylaxis/abortive and rescue medications  MRI given worsening headaches and the olfactory auras which are new     - propranolol ER (INDERAL LA) 60 MG 24 hr capsule; Take 1 capsule (60 mg) by mouth daily  - SUMAtriptan (IMITREX) 50 MG tablet; Take 1 tablet (50 mg) by mouth at onset of headache for migraine May repeat in 2 hours. Max 4 tablets/24 hours.  - MR Brain w/o Contrast; Future  - promethazine (PHENERGAN) 25 MG tablet; Take 1 tablet (25 mg) by mouth every 6 hours as needed for nausea    Moderate recurrent major depression (H)  Risks, benefits and alternatives discussed    Start selective serotonin reuptake inhibitor given the depression and anxiety, I wouldn't expect the anxiety to be well managed by the buproprion.     Occasional suicidal thoughts, no plan or intent.   Declined me giving her crisis numbers  Verbally contracts for safety today    Generalized anxiety disorder    - sertraline (ZOLOFT) 50 MG tablet; Take 1 tablet (50 mg) by mouth daily             Depression Screening Follow Up    PHQ 4/13/2022   PHQ-9 Total Score 15   Q9: Thoughts of better off dead/self-harm past 2 weeks Several days   F/U: Thoughts of suicide or self-harm No   F/U: Safety concerns No                 Follow Up  1 month with me    Follow Up Actions Taken      Discussed the following ways the patient can remain in a safe environment:        No follow-ups on file.    Nidia Humphries MD  [facility]    Cora Rodriguez is a 31 year old who presents for the following health issues  accompanied by her self.    Headache     History of Present Illness       Mental Health Follow-up:                    Today's PHQ-9         PHQ-9 Total Score: 15  PHQ-9 Q9 Thoughts of better off dead/self-harm past 2 weeks :   (P) Several days  Thoughts of suicide or self harm: (P)  "No  Self-harm Plan:     Self-harm Action:       Safety concerns for self or others: (P) No    How difficult have these problems made it for you to do your work, take care of things at home, or get along with other people: Not difficult at all        Migraines:   Since the patient's last clinic visit, headaches are: worsened  The patient is getting headaches:  Nearly daily  She is able to do normal daily activities when she has a migraine.  The patient is taking the following rescue/relief medications:  Ibuprofen (Advil, Motrin) and Excedrin   Patient states \"I get no relief\" from the rescue/relief medications.   The patient is taking the following medications to prevent migraines:  No medications to prevent migraines  In the past 4 weeks, the patient has gone to an Urgent Care or Emergency Room 0 times times due to headaches.    She eats 2-3 servings of fruits and vegetables daily.She consumes 1 sweetened beverage(s) daily.She exercises with enough effort to increase her heart rate 10 to 19 minutes per day.  She exercises with enough effort to increase her heart rate 7 days per week.   She is taking medications regularly.       Concern - migraines   Onset: at age 21  Description: Patient has had migraines for yrs they are getting worse increases with note taking computer work and light. They have been severe lately she has been getting them weekly and has had 1 for the last 2 weeks   Intensity: severe  Progression of Symptoms:  worsening  Accompanying Signs & Symptoms: vision changes and different  in scent   Previous history of similar problem: yes   Precipitating factors:        Worsened by: working on computer note taking  light   Alleviating factors:        Improved by: darkness and quiet   Therapies tried and outcome: Patient has been using ibuprofen and Excedrin and has not been helping       Started getting migraines 10 years ago, used to be every 6 months. Gradually getting worse.  Usually will have at least " "one a week.     excedrin will sometimes make them go away    Has had current headache x 2 weeks and \"nothing is touching it\".      Left temple and into the left side of face, sometimes will go across the right frontal region.     Sometimes pulsating pain.     Sometimes more \"pressure and stinging\".     Affecting vision - eyes won't focus on things close up. Only when she has the headache.   Will get random smells - a burning rubber smell.  Others around her don't smell this    Getting more nauseated with the pain.     Not keeping her from sleep.  Wakes with a headache in the morning.     Appetite is poor   Drinking okay.    Social: , five kids ages 4-9 (twins are youngest)    Social History     Tobacco Use     Smoking status: Former Smoker     Quit date: 2019     Years since quittin.4     Smokeless tobacco: Never Used   Vaping Use     Vaping Use: Never used   Substance Use Topics     Alcohol use: Never     Drug use: Never         Review of Systems   Neurological: Positive for headaches.            Objective    /66   Pulse 78   Temp 99.1  F (37.3  C) (Tympanic)   Resp 18   Ht 1.549 m (5' 1\")   Wt 52.2 kg (115 lb)   LMP 2020   SpO2 98%   BMI 21.73 kg/m    Body mass index is 21.73 kg/m .  Physical Exam   GENERAL APPEARANCE: healthy, alert and no distress  NEURO: Normal strength and tone, mentation intact and speech normal  PSYCH: mentation appears normal and affect normal/bright                "

## 2022-04-13 NOTE — PATIENT INSTRUCTIONS
Brain MRI: [phone]      Phenergan (Promethazine) - for nausea    Propranolol ER 60 mg - headache prophylaxis but also may help with anxiety    Sumatriptan- migraine medication to take with aura to prevent the headache from building or becoming severe.     Sertraline - take 1/2 pill daily for 6 days, then increase to a full tablet (50 mg).  This can take 4-6 weeks to have full effect. This is to help with both anxiety and depression.

## 2022-04-13 NOTE — TELEPHONE ENCOUNTER
Triage call    Patient calling to report that she has been experiencing a migraine for 2-3 weeks. Symptoms are  In her left temple and behind her left eye rates the pain as a 6/10 . Vision blurred when trying to see close up and she is having the wiggly lines that go across that look like rain on a window.    Per protocol see in office today or tomorrow.  Care advice given.  Verbalizes understanding and agrees with plan.  Transferred to scheduling    Rosemary Santana RN   [facility] Nurse Advisor  9:00 AM 4/13/2022    COVID 19 Nurse Triage Plan/Patient Instructions    Please be aware that novel coronavirus (COVID-19) may be circulating in the community. If you develop symptoms such as fever, cough, or SOB or if you have concerns about the presence of another infection including coronavirus (COVID-19), please contact your health care provider or visit [email or web address].     Disposition/Instructions    In-Person Visit with provider recommended. Reference Visit Selection Guide.    Thank you for taking steps to prevent the spread of this virus.  o Limit your contact with others.  o Wear a simple mask to cover your cough.  o Wash your hands well and often.    Resources    [facility]: About COVID-19: [email or web address]    CDC: What to Do If You're Sick: www.cdc.gov/coronavirus/2019-ncov/about/steps-when-sick.html    CDC: Ending Home Isolation: www.cdc.gov/coronavirus/2019-ncov/hcp/disposition-in-home-patients.html     CDC: Caring for Someone: www.cdc.gov/coronavirus/2019-ncov/if-you-are-sick/care-for-someone.html     [facility]: Interim Guidance for Hospital Discharge to Home: www.health.[facility].[place].us/diseases/coronavirus/hcp/hospdischarge.pdf    [facility] clinical trials (COVID-19 research studies): clinicalaffairs.[facility]/umn-clinical-trials     Below are the COVID-19 hotlines at the [place] [facility] ([facility]). Interpreters are available.   o For health questions:  Call 415-572-8395 or 1-122.461.9374 (7 a.m. to 7 p.m.)  o For questions about schools and childcare: Call 044-159-1974 or 1-468.963.8803 (7 a.m. to 7 p.m.  wReason for Disposition    Unexplained headache that is present > 24 hours    Additional Information    Negative: Difficult to awaken or acting confused (e.g., disoriented, slurred speech)    Negative: Weakness of the face, arm or leg on one side of the body and new onset    Negative: Numbness of the face, arm or leg on one side of the body and new onset    Negative: Loss of speech or garbled speech and new onset    Negative: Passed out (i.e., fainted, collapsed and was not responding)    Negative: Sounds like a life-threatening emergency to the triager    Negative: Followed a head injury within last 3 days    Negative: Traumatic Brain Injury (TBI) is suspected    Negative: Sinus pain of forehead and yellow or green nasal discharge    Negative: Pregnant    Negative: Unable to walk without falling    Negative: Stiff neck (can't touch chin to chest)    Negative: Possibility of carbon monoxide exposure    Negative: SEVERE headache, states 'worst headache' of life    Negative: SEVERE headache, sudden-onset (i.e., reaching maximum intensity within seconds to 1 hour)    Negative: Severe pain in one eye    Negative: Loss of vision or double vision (Exception: same as prior migraines)    Negative: Patient sounds very sick or weak to the triager    Negative: Fever > 103 F (39.4 C)    Negative: Fever > 100.0 F (37.8 C) and has diabetes mellitus or a weak immune system (e.g., HIV positive, cancer chemotherapy, organ transplant, splenectomy, chronic steroids)    Negative: SEVERE headache (e.g., excruciating) and has had severe headaches before    Negative: SEVERE headache and not relieved by pain meds    Negative: SEVERE headache and vomiting    Negative: SEVERE headache and fever    Negative: New headache and weak immune system (e.g., HIV positive, cancer chemo,  splenectomy, organ transplant, chronic steroids)    Negative: Fever present > 3 days (72 hours)    Negative: Patient wants to be seen    Protocols used: HEADACHE-A-OH

## 2022-04-14 ASSESSMENT — PATIENT HEALTH QUESTIONNAIRE - PHQ9: SUM OF ALL RESPONSES TO PHQ QUESTIONS 1-9: 15

## 2022-04-19 ENCOUNTER — HOSPITAL ENCOUNTER (OUTPATIENT)
Dept: MRI IMAGING | Facility: CLINIC | Age: 32
Discharge: HOME | End: 2022-04-19
Attending: FAMILY MEDICINE | Admitting: FAMILY MEDICINE
Payer: COMMERCIAL

## 2022-04-19 DIAGNOSIS — G43.111 INTRACTABLE MIGRAINE WITH AURA WITH STATUS MIGRAINOSUS: ICD-10-CM

## 2022-04-19 PROCEDURE — 70551 MRI BRAIN STEM W/O DYE: CPT

## 2022-05-05 ENCOUNTER — VIRTUAL VISIT (OUTPATIENT)
Dept: FAMILY MEDICINE | Facility: CLINIC | Age: 32
End: 2022-05-05
Payer: COMMERCIAL

## 2022-05-05 DIAGNOSIS — F41.1 GENERALIZED ANXIETY DISORDER: ICD-10-CM

## 2022-05-05 DIAGNOSIS — G43.111 INTRACTABLE MIGRAINE WITH AURA WITH STATUS MIGRAINOSUS: Primary | ICD-10-CM

## 2022-05-05 DIAGNOSIS — F33.1 MODERATE RECURRENT MAJOR DEPRESSION (H): ICD-10-CM

## 2022-05-05 PROCEDURE — 99214 OFFICE O/P EST MOD 30 MIN: CPT | Mod: GT | Performed by: FAMILY MEDICINE

## 2022-05-05 RX ORDER — PROMETHAZINE HYDROCHLORIDE 25 MG/1
25 TABLET ORAL EVERY 6 HOURS PRN
Qty: 90 TABLET | Refills: 1 | Status: SHIPPED | OUTPATIENT
Start: 2022-05-05

## 2022-05-05 RX ORDER — TOPIRAMATE 25 MG/1
TABLET, FILM COATED ORAL
Qty: 53 TABLET | Refills: 0 | Status: SHIPPED | OUTPATIENT
Start: 2022-05-05 | End: 2022-06-07

## 2022-05-05 ASSESSMENT — ANXIETY QUESTIONNAIRES
5. BEING SO RESTLESS THAT IT IS HARD TO SIT STILL: NOT AT ALL
3. WORRYING TOO MUCH ABOUT DIFFERENT THINGS: NOT AT ALL
1. FEELING NERVOUS, ANXIOUS, OR ON EDGE: MORE THAN HALF THE DAYS
2. NOT BEING ABLE TO STOP OR CONTROL WORRYING: NOT AT ALL
IF YOU CHECKED OFF ANY PROBLEMS ON THIS QUESTIONNAIRE, HOW DIFFICULT HAVE THESE PROBLEMS MADE IT FOR YOU TO DO YOUR WORK, TAKE CARE OF THINGS AT HOME, OR GET ALONG WITH OTHER PEOPLE: SOMEWHAT DIFFICULT
7. FEELING AFRAID AS IF SOMETHING AWFUL MIGHT HAPPEN: NOT AT ALL
6. BECOMING EASILY ANNOYED OR IRRITABLE: MORE THAN HALF THE DAYS
GAD7 TOTAL SCORE: 7

## 2022-05-05 ASSESSMENT — PATIENT HEALTH QUESTIONNAIRE - PHQ9
5. POOR APPETITE OR OVEREATING: NEARLY EVERY DAY
SUM OF ALL RESPONSES TO PHQ QUESTIONS 1-9: 8

## 2022-05-05 NOTE — PATIENT INSTRUCTIONS
Our Clinic hours are:  Mondays    7:20 am - 7 pm  Tues -  Fri  7:20 am - 5 pm    Clinic Phone: [phone]    The clinic lab opens at 7:30 am Mon - Fri and appointments are required.    [facility]. [phone]  Monday  8 am - 7pm  Tues - Fri 8 am - 5:30 pm

## 2022-05-05 NOTE — PROGRESS NOTES
Jennifer is a 31 year old who is being evaluated via a billable video visit.      How would you like to obtain your AVS? MyChart  If the video visit is dropped, the invitation should be resent by: Text to cell phone: [phone]  Will anyone else be joining your video visit? No    Video Start Time: 4:30 PM    Assessment & Plan     Generalized anxiety disorder  Improved on the sertraline  - sertraline (ZOLOFT) 50 MG tablet; Take 1 tablet (50 mg) by mouth daily    Intractable migraine with aura with status migrainosus   no real improvement on the propranolol  Start topamax, stop propranolol    - topiramate (TOPAMAX) 25 MG tablet; Take 1 tablet (25 mg) by mouth daily for 7 days, THEN 2 tablets (50 mg) daily for 23 days.  - promethazine (PHENERGAN) 25 MG tablet; Take 1 tablet (25 mg) by mouth every 6 hours as needed for nausea    Moderate recurrent major depression (H)  improved                   No follow-ups on file.    Nidia Humphries MD  [facility]   Jennifer is a 31 year old who presents for the following health issues     HPI     Anxiety Follow-Up    How are you doing with your anxiety since your last visit? Improved     Are you having other symptoms that might be associated with anxiety? No    Have you had a significant life event? No     Are you feeling depressed? No    Do you have any concerns with your use of alcohol or other drugs? No    Social History     Tobacco Use     Smoking status: Former Smoker     Quit date: 2019     Years since quittin.4     Smokeless tobacco: Never Used   Vaping Use     Vaping Use: Never used   Substance Use Topics     Alcohol use: Never     Drug use: Never     No flowsheet data found.  PHQ 2022   PHQ-9 Total Score 15 8   Q9: Thoughts of better off dead/self-harm past 2 weeks Several days Not at all   F/U: Thoughts of suicide or self-harm No -   F/U: Safety concerns No -     Last PHQ-9 2022   1.  Little interest  or pleasure in doing things 0   2.  Feeling down, depressed, or hopeless 0   3.  Trouble falling or staying asleep, or sleeping too much 2   4.  Feeling tired or having little energy 3   5.  Poor appetite or overeating 0   6.  Feeling bad about yourself 0   7.  Trouble concentrating 3   8.  Moving slowly or restless 0   Q9: Thoughts of better off dead/self-harm past 2 weeks 0   PHQ-9 Total Score 8   Difficulty at work, home, or with people Somewhat difficult   In the past two weeks have you had thoughts of suicide or self harm? -   Do you have concerns about your personal safety or the safety of others? -     No flowsheet data found.    Migraine     Since your last clinic visit, how have your headaches changed?  No change    How often are you getting headaches or migraines? daily     Are you able to do normal daily activities when you have a migraine? No    Are you taking rescue/relief medications? (Select all that apply) sumatriptan (Imitrex)    How helpful is your rescue/relief medication?  I get only a small amount of relief    Are you taking any medications to prevent migraines? (Select all that apply)  No    In the past 4 weeks, how often have you gone to urgent care or the emergency room because of your headaches?  0      How many servings of fruits and vegetables do you eat daily?  2-3    On average, how many sweetened beverages do you drink each day (Examples: soda, juice, sweet tea, etc.  Do NOT count diet or artificially sweetened beverages)?   0    How many days per week do you exercise enough to make your heart beat faster? 3 or less    How many minutes a day do you exercise enough to make your heart beat faster? 9 or less    How many days per week do you miss taking your medication? 0    Migraine medication - helps if taking every day.    Somewhat tired on the propranolol.              Review of Systems   Constitutional, HEENT, cardiovascular, pulmonary, gi and gu systems are negative, except as  otherwise noted.      Objective           Vitals:  No vitals were obtained today due to virtual visit.    Physical Exam   GENERAL: Healthy, alert and no distress  EYES: Eyes grossly normal to inspection.  No discharge or erythema, or obvious scleral/conjunctival abnormalities.  RESP: No audible wheeze, cough, or visible cyanosis.  No visible retractions or increased work of breathing.    SKIN: Visible skin clear. No significant rash, abnormal pigmentation or lesions.  NEURO: Cranial nerves grossly intact.  Mentation and speech appropriate for age.  PSYCH: Mentation appears normal, affect normal/bright, judgement and insight intact, normal speech and appearance well-groomed.                Video-Visit Details    Type of service:  Video Visit    Video End Time:4:38 PM    Originating Location (pt. Location): Home    Distant Location (provider location):  [facility]     Platform used for Video Visit: [employer]

## 2022-05-06 ASSESSMENT — ANXIETY QUESTIONNAIRES: GAD7 TOTAL SCORE: 7

## 2022-05-21 ENCOUNTER — HEALTH MAINTENANCE LETTER (OUTPATIENT)
Age: 32
End: 2022-05-21

## 2022-09-15 ENCOUNTER — HOSPITAL ENCOUNTER (EMERGENCY)
Facility: CLINIC | Age: 32
Discharge: HOME | End: 2022-09-15
Attending: NURSE PRACTITIONER | Admitting: NURSE PRACTITIONER
Payer: COMMERCIAL

## 2022-09-15 VITALS
SYSTOLIC BLOOD PRESSURE: 126 MMHG | OXYGEN SATURATION: 99 % | RESPIRATION RATE: 18 BRPM | WEIGHT: 120 LBS | DIASTOLIC BLOOD PRESSURE: 76 MMHG | HEART RATE: 72 BPM | BODY MASS INDEX: 22.67 KG/M2 | TEMPERATURE: 98 F

## 2022-09-15 DIAGNOSIS — S61.219A FINGER LACERATION: ICD-10-CM

## 2022-09-15 PROCEDURE — 99282 EMERGENCY DEPT VISIT SF MDM: CPT | Mod: 25 | Performed by: NURSE PRACTITIONER

## 2022-09-15 PROCEDURE — 99283 EMERGENCY DEPT VISIT LOW MDM: CPT | Performed by: NURSE PRACTITIONER

## 2022-09-15 PROCEDURE — 12001 RPR S/N/AX/GEN/TRNK 2.5CM/<: CPT | Performed by: NURSE PRACTITIONER

## 2022-09-15 ASSESSMENT — ENCOUNTER SYMPTOMS: WOUND: 1

## 2022-09-15 NOTE — ED PROVIDER NOTES
History     Chief Complaint   Patient presents with     Laceration     L index finger,  pt prefers ER visit     HPI  Jennifer Art is a 32 year old female who presents to the emergency department for evaluation of laceration to the left index finger. Prior to arrival patient accidentally cut finger with a . Continues to bleed upon arrival. No anticoagulated. Tetanus up to date. Right hand dominant.     Allergies:  Allergies   Allergen Reactions     Citalopram Unknown     shaking     Gluten [Gluten Meal] Unknown     Intolerance, stomach cramping       Problem List:    There are no problems to display for this patient.       Past Medical History:    Past Medical History:   Diagnosis Date     Anemia      Anxiety      Depression      Menorrhagia      Migraines        Past Surgical History:    Past Surgical History:   Procedure Laterality Date     APPENDECTOMY       LAPAROSCOPIC HYSTERECTOMY TOTAL N/A 7/10/2020    Procedure: ROBOTIC TOTAL LAPAROSCOPIC HYSTERECTOMY BILATERAL SALPINGECTOMY CYSTOSCOPY;  Surgeon: Bozena Lugo MD;  [facility];  Service: Gynecology     OTHER SURGICAL HISTORY      wisdom teeth extractions     OTHER SURGICAL HISTORY      C-secction       Family History:    No family history on file.    Social History:  Marital Status:   [2]  Social History     Tobacco Use     Smoking status: Former Smoker     Quit date: 2019     Years since quittin.8     Smokeless tobacco: Never Used   Vaping Use     Vaping Use: Never used   Substance Use Topics     Alcohol use: Never     Drug use: Never        Medications:    promethazine (PHENERGAN) 25 MG tablet  sertraline (ZOLOFT) 50 MG tablet  SUMAtriptan (IMITREX) 50 MG tablet  topiramate (TOPAMAX) 25 MG tablet          Review of Systems   Skin: Positive for wound.   All other systems reviewed and are negative.      Physical Exam   BP: 126/76  Pulse: 72  Temp: 98  F (36.7  C)  Resp: 18  Weight: 54.4 kg (120  lb)  SpO2: 99 %      Physical Exam  Constitutional:       General: She is not in acute distress.     Appearance: Normal appearance.   Eyes:      Conjunctiva/sclera: Conjunctivae normal.      Pupils: Pupils are equal, round, and reactive to light.   Cardiovascular:      Rate and Rhythm: Normal rate.   Pulmonary:      Effort: Pulmonary effort is normal.   Musculoskeletal:         General: Normal range of motion.      Cervical back: Normal range of motion.   Skin:     General: Skin is warm.      Capillary Refill: Capillary refill takes less than 2 seconds.      Comments: 1.2 cm laceration to left index finger at the DIP joint, full strength, mobility and sensation present.   Neurological:      General: No focal deficit present.      Mental Status: She is alert.         ED [facility] [place]    -Laceration Repair    Date/Time: 9/15/2022 2:30 PM  Performed by: Priti Gee APRN CNP  Authorized by: Priti Gee APRN CNP     Risks, benefits and alternatives discussed.      ANESTHESIA (see MAR for exact dosages):     Anesthesia method:  Local infiltration    Local anesthetic:  Bupivacaine 0.25% w/o epi  LACERATION DETAILS     Location:  Finger    Finger location:  L index finger    Length (cm):  1.2  EXPLORATION:     Wound exploration: wound explored through full range of motion and entire depth of wound probed and visualized      TREATMENT:     Area cleansed with:  Betadine and Hibiclens    Amount of cleaning:  Standard    Irrigation solution:  Sterile water    Irrigation method:  Syringe    SKIN REPAIR     Repair method:  Sutures    Suture size:  4-0    Suture material:  Nylon    Suture technique:  Simple interrupted    Number of sutures:  4    APPROXIMATION     Approximation:  Close    POST-PROCEDURE DETAILS     Dressing:  Antibiotic ointment and non-adherent dressing        PROCEDURE    Patient Tolerance:  Patient tolerated the procedure well with no immediate  complications    No results found for this or any previous visit (from the past 24 hour(s)).    Medications - No data to display    Assessments & Plan (with Medical Decision Making)   Jennifer Art is a 32 year old female who presents to the emergency department for evaluation of laceration to the left index finger. Vitals normal. Exam as above. Sutures to be removed in 7-10 days. Discussed home wound care and reasons to seek reevaluation sooner. Patient is agreeable to plan of care and comfortable with discharge. Discharged in good condition.     I have reviewed the nursing notes.    I have reviewed the findings, diagnosis, plan and need for follow up with the patient.    Discharge Medication List as of 9/15/2022  2:37 PM          Final diagnoses:   Finger laceration       9/15/2022   [facility] EMERGENCY DEPT     Priti Gee, APRN CNP  09/15/22 1555

## 2022-09-17 ENCOUNTER — HEALTH MAINTENANCE LETTER (OUTPATIENT)
Age: 32
End: 2022-09-17

## 2022-12-16 NOTE — MR AVS SNAPSHOT
After Visit Summary   3/13/2018    Jennifer Art    MRN: [id number]           Patient Information     Date Of Birth          1990        Visit Information        Provider Department      3/13/2018 2:00 PM Alvarez Sandoval MD [facility] Maternal Fetal Medicine - [place]        Today's Diagnoses     Monochorionic diamniotic twin gestation in second trimester    -  1    Single umbilical artery           Follow-ups after your visit        Your next 10 appointments already scheduled     Mar 27, 2018  1:30 PM CDT   (Arrive by 1:15 PM)   MFM US COMPRE TWINS F/U with [id number]   [facility] Maternal Fetal Medicine Ultrasound - [facility])    [address]  [facility] [address] [phone]           Wear comfortable clothes and leave your valuables at home.            Mar 27, 2018  2:00 PM CDT   Radiology MD with  CLOVIS GIRON   [facility] Maternal Fetal Medicine [facility])    [address]  [facility] [address] [phone]           Please arrive at the time given for your first appointment. This visit is used internally to schedule the physician's time during your ultrasound.            Apr 10, 2018  1:30 PM CDT   (Arrive by 1:15 PM)   MFM US COMPRE TWINS F/U with [id number]   [facility] Maternal Fetal Medicine Ultrasound - [facility] ([facility])    [address]  [facility] [id number] [phone]           Wear comfortable clothes and leave your valuables at home.            Apr 10, 2018  2:00 PM CDT   Radiology MD with [facility] MD   [facility] Maternal Fetal Medicine [facility])    [address]  [facility] [id number] [phone]           Please arrive at the time given for your first appointment. This visit is used internally to schedule the physician's time during your ultrasound.              Who to contact     If you have questions or  "need follow up information about today's clinic visit or your schedule please contact [facility] MATERNAL FETAL MEDICINE [facility] directly at [phone].  Normal or non-critical lab and imaging results will be communicated to you by MyChart, letter or phone within 4 business days after the clinic has received the results. If you do not hear from us within 7 days, please contact the clinic through [employer] or phone. If you have a critical or abnormal lab result, we will notify you by phone as soon as possible.  Submit refill requests through [employer] or call your pharmacy and they will forward the refill request to us. Please allow 3 business days for your refill to be completed.          Additional Information About Your Visit        [employer] Information     [employer] lets you send messages to your doctor, view your test results, renew your prescriptions, schedule appointments and more. To sign up, go to [email or web address] . Click on \"Log in\" on the left side of the screen, which will take you to the Welcome page. Then click on \"Sign up Now\" on the right side of the page.     You will be asked to enter the access code listed below, as well as some personal information. Please follow the directions to create your username and password.     Your access code is: [email or web address]  Expires: 2018  6:36 PM     Your access code will  in 90 days. If you need help or a new code, please call your [place] clinic or [phone].        Care EveryWhere ID     This is your Care EveryWhere ID. This could be used by other organizations to access your [facility] medical records  [id number]         Blood Pressure from Last 3 Encounters:   No data found for BP    Weight from Last 3 Encounters:   No data found for Wt              Today, you had the following     No orders found for display       Primary Care Provider Office Phone # Fax #    Talisha Austin [phone]       [facility] [address] [place] [address]        Equal Access to Services     [facility] : Hadii aad ku hadaleahottoniel Rainey, waoscar newton, vivirandy becerra. So [facility] [phone].    ATENCIÓN: Si habla español, tiene a campos disposición servicios gratuitos de asistencia lingüística. Llame al [phone].    We comply with applicable federal civil rights laws and [place] laws. We do not discriminate on the basis of race, color, national origin, age, disability, sex, sexual orientation, or gender identity.            Thank you!     Thank you for choosing [facility]  for your care. Our goal is always to provide you with excellent care. Hearing back from our patients is one way we can continue to improve our services. Please take a few minutes to complete the written survey that you may receive in the mail after your visit with us. Thank you!             Your Updated Medication List - Protect others around you: Learn how to safely use, store and throw away your medicines at www.disposemymeds.org.      Notice  As of 3/13/2018  2:10 PM    You have not been prescribed any medications.       show

## 2023-06-04 ENCOUNTER — HEALTH MAINTENANCE LETTER (OUTPATIENT)
Age: 33
End: 2023-06-04

## 2024-02-08 ENCOUNTER — TELEPHONE (OUTPATIENT)
Dept: FAMILY MEDICINE | Facility: CLINIC | Age: 34
End: 2024-02-08
Payer: COMMERCIAL

## 2024-02-08 NOTE — LETTER
My Depression Action Plan  Name: Jennifer Art   Date of Birth 1990  Date: 2/8/2024    My doctor: Talisha Austin   My clinic: [facility]  [address] [facility] [address]  [phone]            GREEN    ZONE   Good Control    What it looks like:   Things are going generally well. You have normal ups and downs. You may even feel depressed from time to time, but bad moods usually last less than a day.   What you need to do:  Continue to care for yourself (see self care plan)  Check your depression survival kit and update it as needed  Follow your physician s recommendations including any medication.  Do not stop taking medication unless you consult with your physician first.             YELLOW         ZONE Getting Worse    What it looks like:   Depression is starting to interfere with your life.   It may be hard to get out of bed; you may be starting to isolate yourself from others.  Symptoms of depression are starting to last most all day and this has happened for several days.   You may have suicidal thoughts but they are not constant.   What you need to do:     Call your care team. Your response to treatment will improve if you keep your care team informed of your progress. Yellow periods are signs an adjustment may need to be made.     Continue your self-care.  Just get dressed and ready for the day.  Don't give yourself time to talk yourself out of it.    Talk to someone in your support network.    Open up your Depression Self-Care Plan/Wellness Kit.             RED    ZONE Medical Alert - Get Help    What it looks like:   Depression is seriously interfering with your life.   You may experience these or other symptoms: You can t get out of bed most days, can t work or engage in other necessary activities, you have trouble taking care of basic hygiene, or basic responsibilities, thoughts of suicide or death that will not go away, self-injurious  behavior.     What you need to do:  Call your care team and request a same-day appointment. If they are not available (weekends or after hours) call your local crisis line, emergency room or 911.          Depression Self-Care Plan / Wellness Kit    Many people find that medication and therapy are helpful treatments for managing depression. In addition, making small changes to your everyday life can help to boost your mood and improve your wellbeing. Below are some tips for you to consider. Be sure to talk with your medical provider and/or behavioral health consultant if your symptoms are worsening or not improving.     Sleep   Sleep hygiene  means all of the habits that support good, restful sleep. It includes maintaining a consistent bedtime and wake time, using your bedroom only for sleeping or sex, and keeping the bedroom dark and free of distractions like a computer, smartphone, or television.     Develop a Healthy Routine  Maintain good hygiene. Get out of bed in the morning, make your bed, brush your teeth, take a shower, and get dressed. Don t spend too much time viewing media that makes you feel stressed. Find time to relax each day.    Exercise  Get some form of exercise every day. This will help reduce pain and release endorphins, the  feel good  chemicals in your brain. It can be as simple as just going for a walk or doing some gardening, anything that will get you moving.      Diet  Strive to eat healthy foods, including fruits and vegetables. Drink plenty of water. Avoid excessive sugar, caffeine, alcohol, and other mood-altering substances.     Stay Connected with Others  Stay in touch with friends and family members.    Manage Your Mood  Try deep breathing, massage therapy, biofeedback, or meditation. Take part in fun activities when you can. Try to find something to smile about each day.     Psychotherapy  Be open to working with a therapist if your provider recommends it.     Medication  Be sure to  take your medication as prescribed. Most anti-depressants need to be taken every day. It usually takes several weeks for medications to work. Not all medicines work for all people. It is important to follow-up with your provider to make sure you have a treatment plan that is working for you. Do not stop your medication abruptly without first discussing it with your provider.    Crisis Resources   These hotlines are for both adults and children. They and are open 24 hours a day, 7 days a week unless noted otherwise.    National Suicide Prevention Lifeline   988 or 4-812-244-RHAA (4372)    Crisis Text Line    www.crisistextline.org  Text HOME to 460972 from anywhere in the [place], anytime, about any type of crisis. A live, trained crisis counselor will receive the text and respond quickly.    [facility] Lifeline for LGBTQ Youth  A national crisis intervention and suicide lifeline for LGBTQ youth under 25. Provides a safe place to talk without judgement. Call 1-582.196.6590; text START to 372263 or visit www.thetrevorproject.org to talk to a trained counselor.    For [facility] crisis numbers, visit the [facility] website at:  https://[place].gov/dhs/people-we-serve/adults/health-care/mental-health/resources/crisis-contacts.jsp

## 2024-02-08 NOTE — TELEPHONE ENCOUNTER
Patient Quality Outreach    Patient is due for the following:   Pap    Next Steps:   - Schedule physical/pap  - Schedule med check follow up  -PHQ/DAP    Type of outreach:    Sent [employer] message.      Questions for provider review:    None           Stefani Huynh, CMA

## 2024-07-13 ENCOUNTER — HEALTH MAINTENANCE LETTER (OUTPATIENT)
Age: 34
End: 2024-07-13

## 2025-07-19 ENCOUNTER — HEALTH MAINTENANCE LETTER (OUTPATIENT)
Age: 35
End: 2025-07-19